# Patient Record
Sex: MALE | Race: OTHER | NOT HISPANIC OR LATINO | URBAN - METROPOLITAN AREA
[De-identification: names, ages, dates, MRNs, and addresses within clinical notes are randomized per-mention and may not be internally consistent; named-entity substitution may affect disease eponyms.]

---

## 2017-01-18 ENCOUNTER — EMERGENCY (EMERGENCY)
Facility: HOSPITAL | Age: 42
LOS: 1 days | Discharge: HOME | End: 2017-01-18
Attending: EMERGENCY MEDICINE | Admitting: EMERGENCY MEDICINE
Payer: COMMERCIAL

## 2017-01-18 VITALS
HEART RATE: 63 BPM | DIASTOLIC BLOOD PRESSURE: 81 MMHG | TEMPERATURE: 98 F | OXYGEN SATURATION: 99 % | RESPIRATION RATE: 16 BRPM | SYSTOLIC BLOOD PRESSURE: 126 MMHG | WEIGHT: 214.95 LBS

## 2017-01-18 VITALS
SYSTOLIC BLOOD PRESSURE: 121 MMHG | DIASTOLIC BLOOD PRESSURE: 81 MMHG | OXYGEN SATURATION: 97 % | TEMPERATURE: 98 F | RESPIRATION RATE: 16 BRPM | HEART RATE: 55 BPM

## 2017-01-18 DIAGNOSIS — T78.1XXA OTHER ADVERSE FOOD REACTIONS, NOT ELSEWHERE CLASSIFIED, INITIAL ENCOUNTER: ICD-10-CM

## 2017-01-18 DIAGNOSIS — Z91.013 ALLERGY TO SEAFOOD: ICD-10-CM

## 2017-01-18 PROCEDURE — 99284 EMERGENCY DEPT VISIT MOD MDM: CPT

## 2017-01-18 PROCEDURE — 99284 EMERGENCY DEPT VISIT MOD MDM: CPT | Mod: 25

## 2017-01-18 PROCEDURE — 96375 TX/PRO/DX INJ NEW DRUG ADDON: CPT

## 2017-01-18 PROCEDURE — 96374 THER/PROPH/DIAG INJ IV PUSH: CPT

## 2017-01-18 RX ORDER — FAMOTIDINE 10 MG/ML
20 INJECTION INTRAVENOUS ONCE
Qty: 0 | Refills: 0 | Status: COMPLETED | OUTPATIENT
Start: 2017-01-18 | End: 2017-01-18

## 2017-01-18 RX ORDER — DIPHENHYDRAMINE HCL 50 MG
50 CAPSULE ORAL ONCE
Qty: 0 | Refills: 0 | Status: COMPLETED | OUTPATIENT
Start: 2017-01-18 | End: 2017-01-18

## 2017-01-18 RX ORDER — SODIUM CHLORIDE 9 MG/ML
2000 INJECTION INTRAMUSCULAR; INTRAVENOUS; SUBCUTANEOUS ONCE
Qty: 0 | Refills: 0 | Status: COMPLETED | OUTPATIENT
Start: 2017-01-18 | End: 2017-01-18

## 2017-01-18 RX ADMIN — FAMOTIDINE 20 MILLIGRAM(S): 10 INJECTION INTRAVENOUS at 22:01

## 2017-01-18 RX ADMIN — Medication 50 MILLIGRAM(S): at 22:01

## 2017-01-18 RX ADMIN — SODIUM CHLORIDE 1000 MILLILITER(S): 9 INJECTION INTRAMUSCULAR; INTRAVENOUS; SUBCUTANEOUS at 22:01

## 2017-01-18 RX ADMIN — Medication 40 MILLIGRAM(S): at 22:01

## 2017-01-18 NOTE — ED PROVIDER NOTE - PHYSICAL EXAMINATION
CON: ao x 3, HENMT: clear oropharynx, soft neck, HEAD: atraumatic, CV: rrr, equal pulses b/l, RESP: cta b/l, GI: +BS, soft, nontender, no rebound, no guarding, SKIN: generalized blanching urticaria, neg nikolsky, nonvesicular, no petechiae, MSK: moving all extremities spontaneously, NEURO: nonfocal

## 2017-01-18 NOTE — ED PROVIDER NOTE - MEDICAL DECISION MAKING DETAILS
generalized pruritis, blanching, no wheezing, no nv, will trial of benadryl/pepcid/steroid/fluids, reassess

## 2017-01-18 NOTE — ED ADULT NURSE NOTE - OBJECTIVE STATEMENT
pt received into spot 3 A&Ox3 ambulatory appears comfortable complaining of itching, rash, nausea no vomiting also admits to a sensation of itching in his throat feeling like a lump ws there after drinking water. Pt denies any throat involvement at this time, respirations appear even and unlabored sating at 99% on room air. Eyes and hands appear swollen, with redness across truck area. 20G to RAC pt medicated per orders will monitor and reassess, pt in NAD

## 2017-01-18 NOTE — ED ADULT TRIAGE NOTE - CHIEF COMPLAINT QUOTE
" at  7 pm after eating shrimp I felt like my throat closing and had rashes all over my body, now Im feeling better but still having rashes" noted facial swelling, rashes, speaking in full sentences, observed drinking water at triage

## 2017-01-18 NOTE — ED PROVIDER NOTE - OBJECTIVE STATEMENT
41 yom pw pruritis, throat itchiness after eating shrimp/chicken dumpling tonight at 730pm.  sx started at 8pm.  noted redness to his body w/ pruritis.  no vomiting.  currently feels a little improved, throat itchiness resolved, denies nauseous, no sob.  denies other exposures.

## 2017-01-19 RX ORDER — EPINEPHRINE 0.3 MG/.3ML
0.3 INJECTION INTRAMUSCULAR; SUBCUTANEOUS
Qty: 2 | Refills: 0 | OUTPATIENT
Start: 2017-01-19

## 2018-11-08 ENCOUNTER — APPOINTMENT (OUTPATIENT)
Dept: ORTHOPEDIC SURGERY | Facility: CLINIC | Age: 43
End: 2018-11-08
Payer: COMMERCIAL

## 2018-11-08 VITALS
HEART RATE: 61 BPM | OXYGEN SATURATION: 98 % | SYSTOLIC BLOOD PRESSURE: 133 MMHG | BODY MASS INDEX: 29.78 KG/M2 | HEIGHT: 70 IN | WEIGHT: 208 LBS | DIASTOLIC BLOOD PRESSURE: 87 MMHG

## 2018-11-08 DIAGNOSIS — Z78.9 OTHER SPECIFIED HEALTH STATUS: ICD-10-CM

## 2018-11-08 PROCEDURE — 99203 OFFICE O/P NEW LOW 30 MIN: CPT

## 2018-11-08 PROCEDURE — 73030 X-RAY EXAM OF SHOULDER: CPT | Mod: LT

## 2018-11-08 NOTE — REVIEW OF SYSTEMS
[Joint Pain] : joint pain [Negative] : Heme/Lymph [Feeling Tired] : fatigue [Sight Problems] : vision problems

## 2018-11-15 NOTE — HISTORY OF PRESENT ILLNESS
[de-identified] : Harry is a 42-year-old right-hand dominant gentleman who comes in with pain in his LEFT shoulder that began about 4 yrs ago when he was doing Cross-fit and it got hurt.\par \par In March 2015 he had an MRI of the LEFT shoulder which showed osteolysis in the a.c. joint, edema in the distal supraspinatus muscle belly consistent with a strain but no rotator cuff tears and a type II SLAP tear was present with small para labral cyst more anterior and not affecting the suprascapular nerve.\par \par He went to PT and the Pain went away. He has slight pain intermittently and discomfort at times. It was really not an issue at all for him to work and to exercise.\par Then Last Wed he was pulling a surgical band apart to do strengthening and felt a pop. No swelling or bruising.At first he didn't think much of it. Pain has persisted. Pain is anterior. It is uncomfortable to sleep. He took Advil once since the injury on Tuesday night. Other wise no pain. Pain is mostly an achy feeling at rest but more severe pain 7/10 sometimes when lifting objects.\par It hurts carrying objects but feels "dead' at rest .\par He works for [email or web address] and has to do lifting and carrying.

## 2018-11-15 NOTE — PHYSICAL EXAM
[UE] : Sensory: Intact in bilateral upper extremities [Rad] : radial 2+ and symmetric bilaterally [Normal RUE] : Right Upper Extremity: No scars, rashes, lesions, ulcers, skin intact [Normal LUE] : Left Upper Extremity: No scars, rashes, lesions, ulcers, skin intact [Normal Touch] : sensation intact for touch [Normal] : Oriented to person, place, and time, insight and judgement were intact and the affect was normal [de-identified] : LEFT shoulder:\par Cervical spine is without tenderness and ROM is within normal limits and without pain.\par Normal appearance. No edema, ecchymoses, erythema.\par AROM: 180 FE, IR to T 9 equal to the RIGHT, 180 abd. No painful arc or drop arm.\par PROM: 180 FE, 70 ER at the side, 90 ER and date IR in the 90 degree abducted position.\par Motor:  5/5  supraspinatus,  5/5 ER, 5/5 IR, 5/5 biceps, 5/5 deltoid.  Normal lift off test. Mild pain with stress testing but no appreciable weakness\par - Neer, - Ferrer. -  X-arm.   - [place]chland's, - Speeds.\par Tender over the biceps tendon and the anterior shoulder.  nontender a.c. joint.\par Laxity: normal.\par No scapular winging.\par Sensation is intact distally in the UE.\par Skin is intact in the UE. \par Intact Motor distally.\par  [de-identified] : no respiratory distress [de-identified] : \par X-rays of the LEFT shoulder 3 views AP, lateral and axillary today are unremarkable aside from some evidence of ostial lysis of the distal clavicle with slight widening of the a.c. joint.

## 2018-11-15 NOTE — ASSESSMENT
[FreeTextEntry1] : 42-year-old right-hand dominant gentleman with pain in his LEFT shoulder that started a week ago doing some exercises.He is having pain lifting but he has very good shoulder motion and strength.\par Since his job involves a lot of heavy lifting and pushing and pulling I suggested that he not work for the next 10 days to allow the shoulder to rest. He should try to start the physical therapy next week which was prescribed. Heat and ice as needed. Ibuprofen 400 mg b.i.d. for the next one to 2 weeks. He should follow up in 3-4 weeks to make sure the shoulder is getting better.\par If he's having ongoing pain then we may repeat the MRI. Likely there is still this SLAP tear that was present 4 years ago which was not particularly symptomatic but perhaps he tore it further. 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 I do not suspect any significant rotator cuff tears but obviously we would rule that out with an MRI if pain persists.\par Followup 3-4 weeks

## 2018-12-03 ENCOUNTER — APPOINTMENT (OUTPATIENT)
Dept: ORTHOPEDIC SURGERY | Facility: CLINIC | Age: 43
End: 2018-12-03
Payer: COMMERCIAL

## 2018-12-03 DIAGNOSIS — M22.2X9 PATELLOFEMORAL DISORDERS, UNSPECIFIED KNEE: ICD-10-CM

## 2018-12-03 PROCEDURE — 99214 OFFICE O/P EST MOD 30 MIN: CPT

## 2018-12-18 ENCOUNTER — RX RENEWAL (OUTPATIENT)
Age: 43
End: 2018-12-18

## 2018-12-21 PROBLEM — M54.12 CERVICAL RADICULOPATHY: Status: ACTIVE | Noted: 2018-12-03

## 2018-12-26 ENCOUNTER — APPOINTMENT (OUTPATIENT)
Dept: ORTHOPEDIC SURGERY | Facility: CLINIC | Age: 43
End: 2018-12-26
Payer: COMMERCIAL

## 2018-12-26 DIAGNOSIS — S43.432S SUPERIOR GLENOID LABRUM LESION OF LEFT SHOULDER, SEQUELA: ICD-10-CM

## 2018-12-26 DIAGNOSIS — M54.12 RADICULOPATHY, CERVICAL REGION: ICD-10-CM

## 2018-12-26 DIAGNOSIS — M89.512 OSTEOLYSIS, LEFT SHOULDER: ICD-10-CM

## 2018-12-26 PROCEDURE — 99214 OFFICE O/P EST MOD 30 MIN: CPT

## 2019-01-22 ENCOUNTER — FORM ENCOUNTER (OUTPATIENT)
Age: 44
End: 2019-01-22

## 2019-01-23 ENCOUNTER — APPOINTMENT (OUTPATIENT)
Dept: ORTHOPEDIC SURGERY | Facility: CLINIC | Age: 44
End: 2019-01-23
Payer: COMMERCIAL

## 2019-01-23 ENCOUNTER — OUTPATIENT (OUTPATIENT)
Dept: OUTPATIENT SERVICES | Facility: HOSPITAL | Age: 44
LOS: 1 days | End: 2019-01-23
Payer: COMMERCIAL

## 2019-01-23 VITALS
HEIGHT: 70 IN | WEIGHT: 213 LBS | BODY MASS INDEX: 30.49 KG/M2 | OXYGEN SATURATION: 98 % | DIASTOLIC BLOOD PRESSURE: 73 MMHG | HEART RATE: 74 BPM | SYSTOLIC BLOOD PRESSURE: 115 MMHG

## 2019-01-23 DIAGNOSIS — M25.512 PAIN IN LEFT SHOULDER: ICD-10-CM

## 2019-01-23 PROCEDURE — 72082 X-RAY EXAM ENTIRE SPI 2/3 VW: CPT

## 2019-01-23 PROCEDURE — 72050 X-RAY EXAM NECK SPINE 4/5VWS: CPT | Mod: 26

## 2019-01-23 PROCEDURE — 72100 X-RAY EXAM L-S SPINE 2/3 VWS: CPT

## 2019-01-23 PROCEDURE — 99215 OFFICE O/P EST HI 40 MIN: CPT

## 2019-01-23 PROCEDURE — 72082 X-RAY EXAM ENTIRE SPI 2/3 VW: CPT | Mod: 26

## 2019-01-23 PROCEDURE — 72100 X-RAY EXAM L-S SPINE 2/3 VWS: CPT | Mod: 26

## 2019-01-23 PROCEDURE — 72050 X-RAY EXAM NECK SPINE 4/5VWS: CPT

## 2019-01-23 RX ORDER — METHYLPREDNISOLONE 4 MG/1
4 TABLET ORAL
Qty: 1 | Refills: 0 | Status: DISCONTINUED | COMMUNITY
Start: 2018-12-18 | End: 2019-01-23

## 2019-01-23 NOTE — PHYSICAL EXAM
[de-identified] : Physical Exam:\par \par General: patient is well developed, well nourished, in no acute \par distress, alert and oriented x 3. \par \par Mood and affect: normal\par \par Respiratory: no respiratory distress noted\par \par Skin: no scars over spine, skin intact, no erythema, increased warmth\par \par Alignment:The spine is well compensated in the coronal and sagittal plane. \par \par Gait: The patient is able to toe walk and heel walk without difficulty. \par \par Palpation: no tenderness to palpation cervical spine or paraspinal region\par \par Range of motion: Cervical spine ROM is full\par \par Neurologic Exam:\par Motor: Manual Muscle testing in the upper and lower extremities is 5 out of 5 in all muscle groups. There is no evidence of muscular atrophy in the upper extremities. Sensory: Sensation to light touch is grossly intact in the upper and lower extremities\par \par Reflexes: DTR are present and symmetric throughout, negative bagley bilaterally, negative inverted radial reflex bilaterally, no clonus, plantar responses are flexor\par \par Special tests: Spurlings sign absent. Lhermitte's sign is absent. .\par \par Vascular: Examination of the peripheral vascular system demonstrates no evidence of congestion or edema. no lymphadema bilateral lower extremities, pulses are present and symmetric in both lower extremities.\par \par Left shoulder: mild ttp anterior shoulder over bicipital groove.  no ttp AC joint.  no impingement signs.  neg obriens.  5/5 ER/IR of shoulder.  Full ROM. [de-identified] : MRI cervical: 12/13/18: C3/4: Mod to severe foraminal stenosis bilaterally.  C4/5 mild to moderate bilateral foraminal stenosis, no significant cord compression.  C5/6: severe bilateral foraminal stenosis, no cord compression.  C6/7: right severe foraminal stenosis.  no cord comrpession.  no signal change in cord throughout.

## 2019-01-23 NOTE — DISCUSSION/SUMMARY
[de-identified] : I have discussed my findings with the paitent.  The majority of his pain is over the left shoulder and triggered by motion, particularly overhead.  This is likely from his distal clavicle lesion followed by Dr. Garrett.  He does occaiosnally have "dead arm" feelings and rare radiating left arm pains.  This may be related to his cervical spine.  There is no specific neurocompressive lesion that well explains this.  given his normal exam and rare radiating symptoms, would not pursue further treatment with epidural or surgery.  If becomes more frequent should follow up and likely would get EMG/NCS at that time.

## 2019-01-23 NOTE — HISTORY OF PRESENT ILLNESS
[de-identified] : Referred by Dr. Leeanne Garrett.\par \par Mr. GUERRERO is a very pleasant 43 year old male who complains of left anterior shoulder pain.  Pain worse with motion of the shoulder, particularly overhead.  exacerbated by carrying things also with left arm.  has had imprvement with shoulder PT.  denies neck pain.  denies radaiation of pain or shoulder pain with motion of neck.    . On initial presentation symptoms were as follows: Patient described the pain as intermittent.  Patient rated the pain as moderate-severe.  Does occasionally get "dead" arm and radiation of pain down left upper extremity to the fingers.  This is rare per the patient.\par \par The patient reports no loss of hand dexterity.\par The patient states there no loss of balance when walking.\par There no sensory loss in the arms or legs\par The patent no difficulty with urination.\par \par The patient no history of previous spine surgery.\par The patient no previous spine consultation.\par \par The patient has no history of unexpected weight loss, no history of active cancer, no history bladder or bowel dysfunction, no night pain, no fevers or chills.\par \par The past medical history, surgical history, family history, allergies, medications, review of systems, family history and social history were reviewed and non contributory.

## 2019-02-06 ENCOUNTER — APPOINTMENT (OUTPATIENT)
Dept: ORTHOPEDIC SURGERY | Facility: CLINIC | Age: 44
End: 2019-02-06

## 2019-03-05 PROBLEM — S46.912D STRAIN OF LEFT SHOULDER, SUBSEQUENT ENCOUNTER: Status: ACTIVE | Noted: 2018-11-08

## 2019-03-05 PROBLEM — M48.02 DEGENERATIVE CERVICAL SPINAL STENOSIS: Status: ACTIVE | Noted: 2018-12-21

## 2019-03-05 PROBLEM — M50.30 DEGENERATION OF INTERVERTEBRAL DISC OF CERVICAL REGION WITH OSTEOPHYTE OF CERVICAL VERTEBRA: Status: ACTIVE | Noted: 2018-12-21

## 2019-03-06 ENCOUNTER — APPOINTMENT (OUTPATIENT)
Dept: ORTHOPEDIC SURGERY | Facility: CLINIC | Age: 44
End: 2019-03-06
Payer: COMMERCIAL

## 2019-03-06 DIAGNOSIS — M48.02 SPINAL STENOSIS, CERVICAL REGION: ICD-10-CM

## 2019-03-06 DIAGNOSIS — M25.78 OTHER CERVICAL DISC DEGENERATION, UNSPECIFIED CERVICAL REGION: ICD-10-CM

## 2019-03-06 DIAGNOSIS — M50.30 OTHER CERVICAL DISC DEGENERATION, UNSPECIFIED CERVICAL REGION: ICD-10-CM

## 2019-03-06 DIAGNOSIS — S46.912D STRAIN OF UNSPECIFIED MUSCLE, FASCIA AND TENDON AT SHOULDER AND UPPER ARM LEVEL, LEFT ARM, SUBSEQUENT ENCOUNTER: ICD-10-CM

## 2019-03-06 PROCEDURE — 99213 OFFICE O/P EST LOW 20 MIN: CPT

## 2019-03-06 NOTE — ASSESSMENT
[FreeTextEntry1] : 43-year-old who injured his LEFT shoulder and neck about 4 months ago. His neck and shoulder are feeling significantly better now after the nonoperative treatment with rest, physical therapy and anti-inflammatories. He has good motion and strength and no neurologic deficits. He does get some neck and shoulder discomfort but it is not limiting or persistent in any significant way.\par His MRI of the shoulder did show some inflammation at the a.c. joint which is nontender at this time. If it was would have consider corticosteroid injection.\par He will finish up with the physical therapy. He is going to go back to work full duty next week without any limitations. He should be careful bending and lifting and work on posture to avoid aggravating his condition.\par Followup as needed for any recurrent pain or issues

## 2019-03-06 NOTE — PHYSICAL EXAM
[LE] : Sensory: Intact in bilateral lower extremities [Normal RUE] : Right Upper Extremity: No scars, rashes, lesions, ulcers, skin intact [Normal LUE] : Left Upper Extremity: No scars, rashes, lesions, ulcers, skin intact [Normal Touch] : sensation intact for touch [de-identified] : neck and LEFT shoulder\par  shoulder:\par Cervical spine is with minimal tenderness LEFT neck. Cervical ROM is within normal limits and without pain.\par Normal appearance shoulders\par AROM: 180 FE, IR to T 9 equal to the RIGHT, 180 abd.\par PROM: 180 FE, 70 ER at the side, 90 ER and 60 IR in the 90 degree abducted position.\par Motor:  5/5  supraspinatus,  5/5 ER, 5/5 IR, 5/5 biceps, 5/5 deltoid.  Normal lift off test\par - Sabine,  - Nabil. -  X-arm.   - Pierce's,  - Speeds.\par Tender minimally a.c. joint. Nontender over the biceps tendon and the anterior shoulder.  \par Laxity: normal.\par No scapular winging.\par Sensation is intact distally in the UE.\par Skin is intact in the UE. \par Intact Motor distally.\par

## 2019-03-06 NOTE — HISTORY OF PRESENT ILLNESS
[de-identified] : Harry states that his shoulder and neck are feeling significantly better/less painful. He is not having any numbness or tingling down the arm. He did physical therapy and is doing exercises for the neck. He saw the spine specialist who feels that some of the pain is radicular but that his function is good without neurologic deficits and would not recommend any surgery or any invasive treatment at this time.\par Harry takes occasional ibuprofen a couple days a week as needed for pain. He does feel ready to go back to work. He is not having any significant pain around the shoulder but occasionally feels a little discomfort anteriorly or superiorly

## 2021-02-01 ENCOUNTER — APPOINTMENT (OUTPATIENT)
Dept: ORTHOPEDIC SURGERY | Facility: CLINIC | Age: 46
End: 2021-02-01
Payer: OTHER MISCELLANEOUS

## 2021-02-03 ENCOUNTER — APPOINTMENT (OUTPATIENT)
Dept: ORTHOPEDIC SURGERY | Facility: CLINIC | Age: 46
End: 2021-02-03
Payer: OTHER MISCELLANEOUS

## 2021-02-03 VITALS
HEIGHT: 70 IN | WEIGHT: 195 LBS | HEART RATE: 61 BPM | SYSTOLIC BLOOD PRESSURE: 107 MMHG | DIASTOLIC BLOOD PRESSURE: 70 MMHG | OXYGEN SATURATION: 98 % | BODY MASS INDEX: 27.92 KG/M2

## 2021-02-03 VITALS — BODY MASS INDEX: 27.55 KG/M2 | WEIGHT: 192 LBS

## 2021-02-03 PROCEDURE — 99214 OFFICE O/P EST MOD 30 MIN: CPT

## 2021-02-03 PROCEDURE — 73070 X-RAY EXAM OF ELBOW: CPT | Mod: LT

## 2021-02-03 PROCEDURE — 99072 ADDL SUPL MATRL&STAF TM PHE: CPT

## 2021-02-03 RX ORDER — DICLOFENAC SODIUM 50 MG/1
50 TABLET, DELAYED RELEASE ORAL TWICE DAILY
Qty: 30 | Refills: 0 | Status: ACTIVE | COMMUNITY
Start: 2021-02-03 | End: 1900-01-01

## 2021-02-17 NOTE — PHYSICAL EXAM
[UE] : 5/5 motor strength in bilateral upper extremities [de-identified] : Elbows\par Skin: Clean, dry, intact. No ecchymosis. No swelling. No palpable joint effusion.\par there is some thickening of the bursal tissue over the LEFT posterior olecranon with a bridge of thickened bursal tissue. Mild- moderate  tenderness in that area.No significant fluid in the olecranon bursa and no erythema or warmth or any signs of infection.\par ROM: RIGHT  0 - 150 degrees extension to flexion, full supination/pronation.  LEFT 0 - 150 degrees extension to flexion, full supination/pronation.\par Painful ROM: None\par Tenderness: Nontender medial epicondyle. Nontender lateral epicondyle. + tender Left olecranon. No tenderness at radial head.\par Strength: 5/5 elbow flexion, 5/5 elbow extension, 5/5 supination, 5/5 pronation\par Stability: Stable to varus/valgus stress\par Vasc: 2+ radial pulse, <2s cap refill\par Sensation: Intact to light touch throughout\par Neuro: Negative tinels at ulnar canal,\par  AIN/PIN/Ulnar nerve intact to motor/sensation.\par \par  [de-identified] : \par x-rays of the LEFT elbow AP and lateral views today show No evidence of any olecranon or other elbow fractures. No chips.

## 2021-02-17 NOTE — ASSESSMENT
[FreeTextEntry1] : 45-year-old had a contusion to his LEFT posterior elbow at work when he banged the elbow leading to olecranon bursitis which has been swelling intermittentl. In the last couple months it hurts when he puts pressure on the olecranon. He has good function with full motion and good strength and normal stability of the elbow.\par I recommended that he keep pressure off the back of his elbow is much as possible.\par He will take an anti-inflammatory, Voltaren 50 mg b.i.d. with meals for the next 2 weeks. He can then use Voltaren gel topically on the area if it still inflamed or tender.\par He'll followup in 3-4 weeks. If it's continuing to bother him we may consider a steroid injection. Often this will quiet down over time and rarely could require bursectomy. Infection is unlikely although possible that there is any break in the skin nor with injections.\par There is a 33 percent temporary impairment LEFT elbow. He may work but should avoid pressure on his LEFT elbow for any extended time or should pad the elbow area.

## 2021-02-17 NOTE — HISTORY OF PRESENT ILLNESS
[de-identified] : Mr. Harris is a 44 yo RHD who works for [employer] who presents for a left elbow injury that occurred 10/26/20 at work when he hit his elbow on a wall at work\par He had been laying down cable and as he got up he banged his elbow. Over the next day it was very bruised and swollen. Gradually got better. He did not see anyone initially because he thought it was just a bad bruise and swelling and would get better on its own. He had ongoing pain. Every time he touches it or hits it or puts pressure on the back of the elbow he gets pain and sometimes it will swell up It was swollen a few weeks ago when he had been putting pressure on it during work.Pain can be about 6/10 intermittently and throbbing. It's better with rest and worse lying down or putting pressure on it. He has been able to move it.

## 2021-10-29 ENCOUNTER — APPOINTMENT (OUTPATIENT)
Dept: ORTHOPEDIC SURGERY | Facility: CLINIC | Age: 46
End: 2021-10-29
Payer: OTHER MISCELLANEOUS

## 2021-10-29 DIAGNOSIS — S50.02XD CONTUSION OF LEFT ELBOW, SUBSEQUENT ENCOUNTER: ICD-10-CM

## 2021-10-29 DIAGNOSIS — M70.22 OLECRANON BURSITIS, LEFT ELBOW: ICD-10-CM

## 2021-10-29 PROCEDURE — 99072 ADDL SUPL MATRL&STAF TM PHE: CPT

## 2021-10-29 PROCEDURE — 99213 OFFICE O/P EST LOW 20 MIN: CPT

## 2021-10-29 NOTE — REASON FOR VISIT
[Follow-Up Visit] : a follow-up visit for [Workers' Comp: Date of Injury: _______] : This visit is related to worker's compensation. Date of Injury: [unfilled] [FreeTextEntry2] : left elbow

## 2021-10-29 NOTE — PHYSICAL EXAM
[Normal] : Gait: normal [UE] : Sensory: Intact in bilateral upper extremities [Rad] : radial 2+ and symmetric bilaterally [Normal RUE] : Right Upper Extremity: No scars, rashes, lesions, ulcers, skin intact [Normal LUE] : Left Upper Extremity: No scars, rashes, lesions, ulcers, skin intact [Normal Touch] : sensation intact for touch [de-identified] : Elbows\par Skin: Clean, dry, intact. No ecchymosis. No swelling. No palpable joint effusion.\par there is subtle thickening of the bursal tissue over the LEFT posterior olecranon.  No tenderness in that area.No fluid in the olecranon bursa and no erythema or warmth or any signs of infection.\par ROM: RIGHT  0 - 150 degrees extension to flexion, full supination/pronation.  LEFT 0 - 150 degrees extension to flexion, full supination/pronation.\par Painful ROM: None\par Tenderness: Nontender medial epicondyle. Nontender lateral epicondyle. Nontender Left olecranon. No tenderness at radial head.\par Strength: 5/5 elbow flexion, 5/5 elbow extension, 5/5 supination, 5/5 pronation\par Stability: Stable to varus/valgus stress\par Vasc: 2+ radial pulse, <2s cap refill\par Sensation: Intact to light touch throughout\par Neuro: Negative tinels at ulnar canal,\par  AIN/PIN/Ulnar nerve intact to motor/sensation.\par \par  [de-identified] : \par x-rays of the LEFT elbow AP and lateral views 2/3/21 showed no evidence of any olecranon or other elbow fractures. No chips.

## 2021-10-29 NOTE — HISTORY OF PRESENT ILLNESS
[de-identified] : Mr. Harris is a 46 yo RHD who works for [employer] who presents for f/u for a left elbow injury that occurred 10/26/20 at work when he hit his elbow on a wall at work\par I saw him on 1 occasion for this injury February 3, 2021 where he was diagnosed with some mild left olecranon bursitis.\par It was not bad enough that an aspiration or injection were needed.  He was prescribed an anti-inflammatory for a few weeks.  He has been working without any problem.\par He occasionally will get a brief pain if he accidentally bumps his elbow or leans on it a certain way but for the most part he does not feel any pain or have any issues.

## 2021-10-29 NOTE — ASSESSMENT
[FreeTextEntry1] : 45-year-old had a contusion to his LEFT posterior elbow at work when he contused the elbow leading to olecranon bursitis.  His elbow is better.  He will have occasional twinge of pain if he hits the elbow but functionally is 100% fine without any deficits.  He is working full-time without limitations which he may continue to do.\par There is no loss of use.\par He has reached maximum medical improvement.\par Follow-up as needed

## 2021-11-01 ENCOUNTER — TRANSCRIPTION ENCOUNTER (OUTPATIENT)
Age: 46
End: 2021-11-01

## 2021-11-18 ENCOUNTER — TRANSCRIPTION ENCOUNTER (OUTPATIENT)
Age: 46
End: 2021-11-18

## 2022-01-13 ENCOUNTER — TRANSCRIPTION ENCOUNTER (OUTPATIENT)
Age: 47
End: 2022-01-13

## 2022-02-04 ENCOUNTER — TRANSCRIPTION ENCOUNTER (OUTPATIENT)
Age: 47
End: 2022-02-04

## 2022-05-24 NOTE — REASON FOR VISIT
[Follow-Up Visit] : a follow-up visit for [Shoulder Pain] : shoulder pain [Shoulder Injury] : Shoulder Injury [FreeTextEntry2] : neck injury chest pain

## 2024-04-02 NOTE — REASON FOR VISIT
[Worker's Compensation] : This visit is related to worker's compensation [FreeTextEntry2] : left elbow no